# Patient Record
Sex: FEMALE | Race: ASIAN
[De-identification: names, ages, dates, MRNs, and addresses within clinical notes are randomized per-mention and may not be internally consistent; named-entity substitution may affect disease eponyms.]

---

## 2019-12-31 ENCOUNTER — HOSPITAL ENCOUNTER (OUTPATIENT)
Dept: HOSPITAL 62 - RAD | Age: 84
End: 2019-12-31
Attending: INTERNAL MEDICINE
Payer: MEDICARE

## 2019-12-31 DIAGNOSIS — J84.10: ICD-10-CM

## 2019-12-31 DIAGNOSIS — R91.1: Primary | ICD-10-CM

## 2019-12-31 DIAGNOSIS — J43.2: ICD-10-CM

## 2019-12-31 DIAGNOSIS — I25.10: ICD-10-CM

## 2019-12-31 PROCEDURE — A9552 F18 FDG: HCPCS

## 2019-12-31 PROCEDURE — 78815 PET IMAGE W/CT SKULL-THIGH: CPT

## 2019-12-31 NOTE — RADIOLOGY REPORT (SQ)
EXAM DESCRIPTION:  PET CT SKULL/THIGH



COMPLETED DATE/TIME:  12/31/2019 1:30 pm



REASON FOR STUDY:  LUNG NODULE (R91.1) R91.1  SOLITARY PULMONARY NODULE



COMPARISON:  No priors available for comparison.  Report from prior CT chest



RADIONUCLIDE AND DOSE:  11.49 mCi F18 FDG

The route of agent administration: Intravenous



FASTING BLOOD SUGAR:  106 mg/dl



CONTRAST TYPE AND DOSE:  No CT contrast given.



TECHNIQUE:  Blood glucose level was verified.  Above dose of FDG was injected intravenously.  2-D seg
mented attenuation correction images were obtained from the base of the skull to the midthighs.  Nonc
ontrast CT images were obtained for attenuation correction and fusion with emission images.  CT image
s were performed without oral or intravenous contrast and are not sensitive for parenchymal lesions. 
 A series of overlapping emission PET images were obtained.  Images reviewed and manipulated at [facility] work station by the radiologist.  Images stored on PACS.



LIMITATIONS:  None.



FINDINGS:  HEAD AND NECK: No areas of abnormal metabolic activity in the soft tissues of the head and
 neck.

CHEST: Previously described left upper lobe pulmonary nodule measuring 2.4 cm demonstrates avid FDG u
ptake (max SUV 8.8).  No other discrete areas of abnormal uptake within the thorax.

ABDOMEN AND PELVIS: Background hepatic activity max SUV 2.9 no areas of pathologic uptake within the 
abdomen or pelvis.  Expected physiologic activity within the gastrointestinal and genitourinary syste
m.

PROXIMAL LOWER EXTREMITIES: No areas of abnormal metabolic activity in the soft tissues of the lower 
extremities.

BONES: No abnormal metabolic activity in the visualized skeleton.

ADDITIONAL CT FINDINGS: Hypermetabolic left upper lobe pulmonary nodule as above.  Severe centrilobul
ar and panacinar emphysema.  Basilar predominant fibrotic change with questionable honeycombing.  Cor
onary atherosclerosis.  Normal heart size.  Scattered hepatic granuloma.  No evidence of acute intra-
abdominal/pelvic process.  Aortoiliac atherosclerosis.  Scattered colonic diverticula.



IMPRESSION:  1.  Hypermetabolic left upper lobe pulmonary nodule measuring 2.4 cm (max SUV 8.8) most 
compatible with malignancy.

2.  No additional discrete areas of abnormal FDG uptake to suggest metastatic disease.

3.  Background severe emphysema and pulmonary fibrosis.



TECHNICAL DOCUMENTATION:  JOB ID:  [id number]

 [employer]- All Rights Reserved



Reading location - IP/workstation name: [email or web address]